# Patient Record
Sex: FEMALE | Race: WHITE | NOT HISPANIC OR LATINO | URBAN - NONMETROPOLITAN AREA
[De-identification: names, ages, dates, MRNs, and addresses within clinical notes are randomized per-mention and may not be internally consistent; named-entity substitution may affect disease eponyms.]

---

## 2017-05-08 PROBLEM — H25.13: Noted: 2017-05-08

## 2017-05-08 PROBLEM — H53.2: Noted: 2017-05-08

## 2017-05-08 PROBLEM — H25.813: Noted: 2021-05-18

## 2017-05-08 PROBLEM — G35: Noted: 2017-05-08

## 2017-05-08 PROBLEM — H49.11: Noted: 2017-05-08

## 2019-03-05 ENCOUNTER — IMPORTED ENCOUNTER (OUTPATIENT)
Dept: URBAN - NONMETROPOLITAN AREA CLINIC 1 | Facility: CLINIC | Age: 54
End: 2019-03-05

## 2019-03-05 PROCEDURE — 92014 COMPRE OPH EXAM EST PT 1/>: CPT

## 2019-03-05 PROCEDURE — 92310 CONTACT LENS FITTING OU: CPT

## 2019-03-05 NOTE — PATIENT DISCUSSION
Cataract OU-Not yet surgical. -Reviewed symptoms of advancing cataract growth such as glare and halos and decreased vision.-Continue to monitor for now. Pt will notify us if any new symptoms develop. Myopia-Discussed diagnosis with patient. -Explained that people who are myopic are at a higher risk for developing RD/RT and reviewed associated S&S.-Pt to contact our office if symptoms develop. CL wear-CLs fit and center well.-Stressed that patient should not sleep in CL. -Updated CL Rx given. -CL care and precautions given. RTC 1 YR CL check

## 2021-05-18 ENCOUNTER — IMPORTED ENCOUNTER (OUTPATIENT)
Dept: URBAN - NONMETROPOLITAN AREA CLINIC 1 | Facility: CLINIC | Age: 56
End: 2021-05-18

## 2021-05-18 PROCEDURE — V2520 CONTACT LENS HYDROPHILIC: HCPCS

## 2021-05-18 PROCEDURE — 92014 COMPRE OPH EXAM EST PT 1/>: CPT

## 2021-05-18 PROCEDURE — 92310 CONTACT LENS FITTING OU: CPT

## 2021-05-18 PROCEDURE — 92015 DETERMINE REFRACTIVE STATE: CPT

## 2021-05-18 NOTE — PATIENT DISCUSSION
Cataract(s)-Visually significant.-Cataract(s) causing symptomatic impairment of visual function not correctable with a tolerable change in glasses or contact lenses lighting or non-operative means resulting in specific activity limitations and/or participation restrictions including but not limited to reading viewing television driving or meeting vocational or recreational needs. -Expectation is clearer vision and reduced glare disability after cataract removal.-Refer to Dr Kasey Flores for cataract evaluation; Dr's Notes: Dr. Shahrzad Crandall sees Kavya Colvin

## 2022-04-09 ASSESSMENT — VISUAL ACUITY
OS_SC: 20/20
OD_SC: 20/20
OD_SC: 20/20
OS_SC: 20/20

## 2022-04-09 ASSESSMENT — TONOMETRY
OS_IOP_MMHG: 16
OD_IOP_MMHG: 15
OD_IOP_MMHG: 16
OS_IOP_MMHG: 16